# Patient Record
Sex: FEMALE | Race: WHITE
[De-identification: names, ages, dates, MRNs, and addresses within clinical notes are randomized per-mention and may not be internally consistent; named-entity substitution may affect disease eponyms.]

---

## 2017-08-23 ENCOUNTER — HOSPITAL ENCOUNTER (EMERGENCY)
Dept: HOSPITAL 74 - JER | Age: 27
Discharge: HOME | End: 2017-08-23
Payer: COMMERCIAL

## 2017-08-23 VITALS — DIASTOLIC BLOOD PRESSURE: 82 MMHG | HEART RATE: 78 BPM | SYSTOLIC BLOOD PRESSURE: 128 MMHG | TEMPERATURE: 98.8 F

## 2017-08-23 VITALS — BODY MASS INDEX: 22.1 KG/M2

## 2017-08-23 DIAGNOSIS — M94.0: Primary | ICD-10-CM

## 2017-08-23 LAB
ALBUMIN SERPL-MCNC: 4.3 G/DL (ref 3.4–5)
ALP SERPL-CCNC: 64 U/L (ref 45–117)
ALT SERPL-CCNC: 23 U/L (ref 12–78)
ANION GAP SERPL CALC-SCNC: 5 MMOL/L (ref 8–16)
APPEARANCE UR: CLEAR
AST SERPL-CCNC: 18 U/L (ref 15–37)
BASOPHILS # BLD: 0.3 % (ref 0–2)
BILIRUB SERPL-MCNC: 1.2 MG/DL (ref 0.2–1)
BILIRUB UR STRIP.AUTO-MCNC: NEGATIVE MG/DL
CALCIUM SERPL-MCNC: 9.2 MG/DL (ref 8.5–10.1)
CK SERPL-CCNC: 97 IU/L (ref 26–192)
CO2 SERPL-SCNC: 32 MMOL/L (ref 21–32)
COLOR UR: (no result)
CREAT SERPL-MCNC: 0.6 MG/DL (ref 0.55–1.02)
DEPRECATED RDW RBC AUTO: 14.3 % (ref 11.6–15.6)
EOSINOPHIL # BLD: 0.4 % (ref 0–4.5)
GLUCOSE SERPL-MCNC: 87 MG/DL (ref 74–106)
KETONES UR QL STRIP: NEGATIVE
LEUKOCYTE ESTERASE UR QL STRIP.AUTO: NEGATIVE
MAGNESIUM SERPL-MCNC: 2.4 MG/DL (ref 1.8–2.4)
MCH RBC QN AUTO: 29.7 PG (ref 25.7–33.7)
MCHC RBC AUTO-ENTMCNC: 33.4 G/DL (ref 32–36)
MCV RBC: 88.8 FL (ref 80–96)
NEUTROPHILS # BLD: 69.7 % (ref 42.8–82.8)
NITRITE UR QL STRIP: NEGATIVE
PH UR: 7 [PH] (ref 5–8)
PLATELET # BLD AUTO: 307 K/MM3 (ref 134–434)
PMV BLD: 7.8 FL (ref 7.5–11.1)
PROT SERPL-MCNC: 7.7 G/DL (ref 6.4–8.2)
PROT UR QL STRIP: NEGATIVE
PROT UR QL STRIP: NEGATIVE
RBC # UR STRIP: NEGATIVE /UL
SP GR UR: 1.02 (ref 1–1.02)
TROPONIN I SERPL-MCNC: < 0.02 NG/ML (ref 0–0.05)
UROBILINOGEN UR STRIP-MCNC: NEGATIVE MG/DL (ref 0.2–1)
WBC # BLD AUTO: 7.3 K/MM3 (ref 4–10)

## 2017-08-23 NOTE — PDOC
History of Present Illness





- General


Chief Complaint: Chest Pain


Stated Complaint: CHEST PAIN


Time Seen by Provider: 08/23/17 12:51


History Source: Patient


Exam Limitations: No Limitations





- History of Present Illness


Initial Comments: 


08/23/17 14:41


Patient is a 27-year-old female, with a past medical history of coronary AV 

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 

hemodynamically insignificant in 2012, who presents to the emergency department 

today complaining of left-sided chest pain and arm pain. Patient states that 

her pain began 2 days ago and has gotten worse over that time she states that 

the pain is mostly on her left side and radiates down her left arm she states 

that it hurts to lift her left arm and causes her more chest pain. She states 

that her chest pain is reproducible with a big breath or palpation. Patient is 

a physical therapy aid. She is currently at the end of her menstrual cycle. 

Endorses palpitations. Denies fevers, chills, recent illness, shortness of 

breath, recent trauma, OCP use, nausea, vomiting, diarrhea, frequency, urgency 

and dysuria.











Past History





- Travel


Traveled outside of the country in the last 30 days: No


Close contact w/someone who was outside of country & ill: No





- Past Medical History


Allergies/Adverse Reactions: 


 Allergies











Allergy/AdvReac Type Severity Reaction Status Date / Time


 


No Known Allergies Allergy   Verified 08/23/17 12:32











Home Medications: 


Ambulatory Orders





Oxycodone HCl/Acetaminophen [Percocet 5-325 mg Tablet] 1 tab PO Q6H PRN #10 

tablet MDD 4 08/23/17 








Cardiac Disorders: Yes (CHILDHOOD CARDIAC VESSEL ABNORMAL)


Other medical history: [facility] 08/19/2017


Comment:: 





08/23/17 17:42


Hx of coronary AV fistula from the LAD to the main pulmonary artery





- Psycho/Social/Smoking Cessation Hx


Anxiety: No


Suicidal Ideation: No


Smoking History: Never smoked


Hx Alcohol Use: Yes (SOCIAL)


Drug/Substance Use Hx: No


Substance Use Type: None





**Review of Systems





- Review of Systems


Able to Perform ROS?: No


Is the patient limited English proficient: No


Constitutional: No: Chills, Fever, Malaise, Weakness


Respiratory: No: Cough, Shortness of Breath, Wheezing


Cardiac (ROS): Yes: Chest Pain, Palpitations.  No: Lightheadedness, Syncope, 

Chest Tightness


ABD/GI: No: Diarrhea, Nausea, Vomiting


Musculoskeletal: Yes: Muscle Pain (L chest)


All Other Systems: Reviewed and Negative





*Physical Exam





- Vital Signs


 Last Vital Signs











Temp Pulse Resp BP Pulse Ox


 


 98.0 F   91 H  20   132/93   97 


 


 08/23/17 12:29  08/23/17 12:29  08/23/17 12:29  08/23/17 12:29  08/23/17 12:29














- Physical Exam


Comments: 





08/23/17 15:10


GENERAL:


Well developed, well nourished. Awake and alert. No acute distress.


HEENT:


Normocephalic, atraumatic. PERRLA, EOMI. No conjunctival pallor. Sclera are non-

icteric. Moist mucous membranes. Oropharynx is clear.


NECK: 


Supple. Full ROM. No JVD. Carotid pulses 2+ and symmetric, without bruits. No 

thyromegaly. No lymphadenopathy.


CARDIOVASCULAR:


TTP of the L chest wall. Reproducable pain with inspiration. Regular rate and 

rhythm. No murmurs, rubs, or gallops. Distal pulses are 2+ and symmetric. 


PULMONARY: 


No evidence of respiratory distress. Lungs clear to auscultation bilaterally. 

No wheezing, rales or rhonchi.


ABDOMINAL:


Soft. Non-tender. Non-distended. No rebound or guarding. No organomegaly. 

Normoactive bowel sounds. 


MUSCULOSKELETAL 


Normal range of motion at all joints. No bony deformities or tenderness. No CVA 

tenderness.


EXTREMITIES: 


No cyanosis. No clubbing. No edema. No calf tenderness.


SKIN: 


Warm and dry. Normal capillary refill. No rashes. No jaundice. 


NEUROLOGICAL: 


Alert, awake, appropriate. Cranial nerves 2-12 intact. No deficits to light 

touch and temperature in face, upper extremities and lower extremities. No 

motor deficits in the in face, upper extremities and lower extremities. 

Normoreflexic in the upper and lower extremities. Normal speech. Toes are down-

going bilaterally. Gait is normal without ataxia.


PSYCHIATRIC: 


Cooperative. Good eye contact. Appropriate mood and affect.





ED Treatment Course





- LABORATORY


CBC & Chemistry Diagram: 


 08/23/17 13:30





 08/23/17 13:30





Medical Decision Making





- Medical Decision Making


08/23/17 14:47


Pt. is a 26 y/o female with PMH of AV fistula of the LAD and pulmonary artery, 

who presents to the ED c/o left sided chest pain. Will r/o ACS at this time. 

Other possible dx includes pneumonia, costrochondritis Will obtain old medical 

records from her cardiologist in [facility]. Pt has no current cardiologist





1. CBC, CMP, Cardiac Labs, UA, Upregnancy


2. EKG, CXR


3. Torodol


4. Re-evaluate





08/23/17 16:40


Medical records received from [facility]. Spoke with Dr. Tate, cardiology on 

call. States that fistulas are slow changing and probably not the source of her 

pain. As long as trop is negative, pt can go home with outpatient follow up.





EKG: NSR rate 77 normal intervals, axis. No acute ST-T wave changes.





Pt. still c/o pain. Will give morphine at this time.





08/23/17 17:06


Trop is negative. Pt. reporting relief of pain with morphine. Will discharge 

home with percocets and cardiology follow up





*DC/Admit/Observation/Transfer


Diagnosis at time of Disposition: 


 Acute costochondritis





- Discharge Dispostion


Disposition: HOME


Admit: No





- Prescriptions


Prescriptions: 


Oxycodone HCl/Acetaminophen [Percocet 5-325 mg Tablet] 1 tab PO Q6H PRN #10 

tablet MDD 4


 PRN Reason: Pain





- Referrals


Referrals: 


Selma Fields MD [Primary Care Provider] - 


Dwaine Sebastian MD [Staff Physician] - 





- Patient Instructions


Printed Discharge Instructions:  DI for Atypical Chest Pain


Additional Instructions: 


Your testing was normal today. Her EKG was normal. This is most likely a 

strained muscle in her chest wall. Take ibuprofen 600 mg every 8 hours for one 

week. This medication with food. Your also prescribed Percocet. Take this 

medication as needed for breakthrough pain. Do not drive after taking this 

medication as it may make you sleepy. Follow up with the cardiology consult 

provided in your discharge packet within the week. Follow up with her primary 

care doctor within the week.





Return to the emergency department if you have worsening chest pain, shortness 

of breath, palpitations, fevers, chills or any changes in your symptoms.





- Post Discharge Activity


Work/School Note:  Back to Work

## 2017-08-23 NOTE — PDOC
Attending Attestation





- Resident


Resident Name: Gabrielle Aguilar





- ED Attending Attestation


I have performed the following: I have examined & evaluated the patient, The 

case was reviewed & discussed with the resident, I agree w/resident's findings 

& plan, Exceptions are as noted





- HPI


HPI: 


08/23/17 13:11


27y F hx of unclera cardiac history (had fu with peds cardiology when she was 

young) presents with L sided chest pain that is worse when she lifts her L arm, 

the pain also is worse when she takes a deep breath. Pt deenies any sob, 

hemoptysis, leg swelling, calf pain, coughing, RAMIREZ or worsening of CP on 

exertion.


Pt also notes the pain seems worse when she is reclined.





GENERAL: The patient is awake, alert, and fully oriented, Nontoxic - in no 

acute distress.


HEAD: Normocephalic, atraumatic.


EYES: extraocular movements intact, sclera anicteric, conjunctiva clear.


ENT: Normal voice,  Moist mucous membranes.


NECK: Normal range of motion, supple


LUNGS: Breath sounds equal, clear to auscultation bilaterally.  No wheezes, no 

rhonchi, no rales.


HEART: Regular rate and rhythm, normal S1 and S2 without murmur, rub or gallop.


CHesT: Mild tenderness to L chest on palpation at the lateral insertion of 

pectorallis major


ABDOMEN: Soft, nontender, normoactive bowel sounds.  No guarding, no rebound.  

. No CVA tenderness


EXTREMITIES: Normal range of motion, no edema.  No clubbing or cyanosis. No 

cords, erythema, or tenderness.


NEUROLOGICAL: No facial assymetry, Normal speech, 


PSYCH: Normal mood, normal affect.


SKIN: Warm, Dry, normal turgor,








consider MSK 


no signs of pericarditis via EKG


will reach out to cardiology from [facility] for her cardiac history








- Physicial Exam


PE: 





08/25/17 16:06


see above





- Medical Decision Making





08/25/17 16:06


see above





**Heart Score/ECG Review





- ECG Impressions


Comment:: 





08/23/17 15:11


Twelve-lead EKG was performed and reviewed by me. 


There is normal sinus rhythm with a normal rate. 


Rate of 67


The axis is normal. 


The intervals are normal. 


There is normal R wave progression


There are no ST or T wave abnormalities.





Impression: Normal twelve-lead EKG

## 2017-08-27 NOTE — EKG
Test Reason : 

Blood Pressure : ***/*** mmHG

Vent. Rate : 067 BPM     Atrial Rate : 067 BPM

   P-R Int : 118 ms          QRS Dur : 086 ms

    QT Int : 384 ms       P-R-T Axes : 045 060 039 degrees

   QTc Int : 405 ms

 

NORMAL SINUS RHYTHM WITH SHORT [place]

NO PREVIOUS ECGS AVAILABLE

Confirmed by MARCELLUS MARTINEZ MD ([id number]) on 8/27/2017 9:51:52 PM

 

Referred By:             Confirmed By:MARCELLUS MARTINEZ MD

## 2019-03-18 ENCOUNTER — HOSPITAL ENCOUNTER (EMERGENCY)
Dept: HOSPITAL 74 - JERFT | Age: 29
Discharge: HOME | End: 2019-03-18
Payer: SELF-PAY

## 2019-03-18 VITALS — HEART RATE: 118 BPM | SYSTOLIC BLOOD PRESSURE: 134 MMHG | TEMPERATURE: 98.2 F | DIASTOLIC BLOOD PRESSURE: 85 MMHG

## 2019-03-18 VITALS — BODY MASS INDEX: 22.8 KG/M2

## 2019-03-18 DIAGNOSIS — J01.90: Primary | ICD-10-CM

## 2019-03-18 NOTE — PDOC
History of Present Illness





- General


Chief Complaint: Cold Symptoms


Stated Complaint: CHEST PAIN


Time Seen by Provider: 03/18/19 12:32





- History of Present Illness


Initial Comments: 





03/18/19 12:59


29-year-old female with an underlying cardiac abnormality she is unsure of 

presents for evaluation of sinus congestion intermittent headache subjective 

fever and chest pain over the last few days which started after coughing 

episodes.





Past History





- Past Medical History


Allergies/Adverse Reactions: 


 Allergies











Allergy/AdvReac Type Severity Reaction Status Date / Time


 


No Known Allergies Allergy   Verified 03/18/19 12:28











Home Medications: 


Ambulatory Orders





Oxycodone HCl/Acetaminophen [Percocet 5-325 mg Tablet] 1 tab PO Q6H PRN #10 

tablet MDD 4 08/23/17 


Amox-Tr/K Cl [Augmentin - 875Mg Tablet] 1 tab PO BID #20 tablet 03/18/19 


Budesonide [Rhinocort Allergy] 1 spray NS ONCE #1 spray.pump 03/18/19 








Cardiac Disorders: Yes (CHILDHOOD CARDIAC VESSEL ABNORMAL)


COPD: No





- Immunization History


Immunization Up to Date: Yes





- Suicide/Smoking/Psychosocial Hx


Smoking History: Never smoked


Information on smoking cessation initiated: No


Hx Alcohol Use: No


Drug/Substance Use Hx: No


Substance Use Type: None





**Review of Systems





- Review of Systems


Constitutional: Yes: Fever


HEENTM: Yes: Nose Congestion


Respiratory: Yes: Cough


Cardiac (ROS): Yes: See HPI, Chest Pain


Neurological: Yes: Headache





*Physical Exam





- Vital Signs


 Last Vital Signs











Temp Pulse Resp BP Pulse Ox


 


 98.2 F   118 H  16   134/85   99 


 


 03/18/19 12:28  03/18/19 12:28  03/18/19 12:28  03/18/19 12:28  03/18/19 12:28














- Physical Exam


Comments: 





03/18/19 13:01


HEAD: NC/AT tenderness over the frontal sinuses.


EYES: Conjuntiva clear


Ears: Canals and TM's normal


NOSE: Clear discharge injected turbinates.


THROAT: Moist mucous membrances, oral pharanx clear, uvula midline


NECK: Supple without adenopathy


CARDIAC: S1 S2


LUNGS: CTA Full and Equal breath sounds


ABDOMEN: Soft NT ND


MS: Full ROM in all joints without edema 


NEUROLOGIC: No gross sensory or motor deficits, NVID


SKIN: Normal color and temperature no lesions or rashes





Moderate Sedation





- Procedure Monitoring


Vital Signs: 


Procedure Monitoring Vital Signs











Temperature  98.2 F   03/18/19 12:28


 


Pulse Rate  118 H  03/18/19 12:28


 


Respiratory Rate  16   03/18/19 12:28


 


Blood Pressure  134/85   03/18/19 12:28


 


O2 Sat by Pulse Oximetry (%)  99   03/18/19 12:28











Medical Decision Making





- Medical Decision Making





03/18/19 13:02


EKG was reviewed with emergency room attending. We'll treat for bacterial 

sinusitis have patient follow-up with ENT.





*DC/Admit/Observation/Transfer


Diagnosis at time of Disposition: 


 Sinusitis








- Discharge Dispostion


Disposition: HOME


Condition at time of disposition: Stable


Decision to Admit order: No





- Prescriptions


Prescriptions: 


Amox-Tr/K Cl [Augmentin - 875Mg Tablet] 1 tab PO BID #20 tablet


Budesonide [Rhinocort Allergy] 1 spray NS ONCE #1 spray.pump





- Referrals


Referrals: 


Fredi Yarbrough MD [Staff Physician] - 





- Patient Instructions


Printed Discharge Instructions:  DI for Sinusitis, Sinusitis


Additional Instructions: 


Please use the nasal spray and antibiotics as directed. Please finish the 

entire course of the antibiotics. Return to the emergency room should symptoms 

worsen follow-up with ear nose and throat doctor in 1-2 days for further 

evaluation and treatment options.





- Post Discharge Activity

## 2019-03-20 NOTE — EKG
Test Reason : 

Blood Pressure : ***/*** mmHG

Vent. Rate : 110 BPM     Atrial Rate : 110 BPM

   P-R Int : 130 ms          QRS Dur : 082 ms

    QT Int : 316 ms       P-R-T Axes : 055 047 025 degrees

   QTc Int : 427 ms

 

SINUS TACHYCARDIA

POSSIBLE LEFT ATRIAL ENLARGEMENT

BORDERLINE ECG

WHEN COMPARED WITH ECG OF 23-AUG-2017 12:39,

VENT. RATE HAS INCREASED BY  43 BPM

Confirmed by JORGE ALBERTO KELLY MD ([id number]) on 3/20/2019 12:07:15 PM

 

Referred By:             Confirmed By:JORGE ALBERTO KELLY MD

## 2020-01-05 ENCOUNTER — HOSPITAL ENCOUNTER (EMERGENCY)
Dept: HOSPITAL 74 - JERFT | Age: 30
Discharge: HOME | End: 2020-01-05
Payer: SELF-PAY

## 2020-01-05 VITALS — HEART RATE: 96 BPM | TEMPERATURE: 98 F | DIASTOLIC BLOOD PRESSURE: 78 MMHG | SYSTOLIC BLOOD PRESSURE: 121 MMHG

## 2020-01-05 VITALS — BODY MASS INDEX: 22.1 KG/M2

## 2020-01-05 DIAGNOSIS — J10.1: Primary | ICD-10-CM

## 2020-01-05 NOTE — PDOC
History of Present Illness





- General


Chief Complaint: Cold Symptoms


Stated Complaint: COUGHING/FEVER/HEADACHE


Time Seen by Provider: 01/05/20 20:10


History Source: Patient


Exam Limitations: No Limitations





- History of Present Illness


Initial Comments: 





01/05/20 20:41


Patient is a 29-year-old female who presents to the ED with complaint of body 

aches, subjective fever, headache and URI symptoms for the last 6 days.  She 

has been taking TheraFlu which has been helping with her fever.  She took 

TheraFlu about 2 hours ago which has helped.  She denies any neck pain.  She 

denies any known sick contacts.  She did not get a flu shot this year.  The 

patient states that she has a history of heart problems but is unsure of what 

kind.








Past History





- Past Medical History


Allergies/Adverse Reactions: 


 Allergies











Allergy/AdvReac Type Severity Reaction Status Date / Time


 


No Known Allergies Allergy   Verified 01/05/20 20:32











Home Medications: 


Ambulatory Orders





NK [No Known Home Medication]  01/05/20 








Cardiac Disorders: Yes (CHILDHOOD CARDIAC VESSEL ABNORMAL)


COPD: No





- Immunization History


Immunization Up to Date: Yes





- Psycho Social/Smoking Cessation Hx


Smoking History: Never smoked


Hx Alcohol Use: No


Drug/Substance Use Hx: No


Substance Use Type: None





**Review of Systems





- Review of Systems


Comments:: 





01/05/20 20:41


- Review of Systems


Able to Perform ROS?: Yes


Constitutional: No: Night Sweats, Weakness; Positive chills, positive 

subjective fever, positive loss of appetite


HEENTM: No: Eye Pain, Vision changes, Ear Pain, Throat Pain, Throat Swelling, 

Mouth Pain, Difficulty Swallowing, positive nasal congestion


Respiratory: No: Cough, Shortness of Breath, Wheezing, Sputum Production


Cardiac (ROS): No: Chest Pain, Chest Tightness, Palpitations, Irregular Heart 

Beat, Edema


ABD/GI: No: Nausea, Vomiting, Abdominal Pain, Diarrhea


: No Dysuria, No Hematuria, No Frequency, No Urgency, No Vaginal Discharge/

Pain, No Penile Discharge/Pain


Musculoskeletal: No: Back Pain, Joint Pain, Muscle Weakness, Neck Pain; 

positive muscle aches


Integumentary: No: Lesions, Rash


Neurological: No: Headache, Numbness, Tingling, Weakness, Speech Difficulties





*Physical Exam





- Vital Signs


 Last Vital Signs











Temp Pulse Resp BP Pulse Ox


 


 98 F   96 H  20   121/78   98 


 


 01/05/20 20:03  01/05/20 20:03  01/05/20 20:03  01/05/20 20:03  01/05/20 20:03














- Physical Exam





01/05/20 20:43


- Physical Exam


General Appearance:  Nourished, Appropriately Dressed; mild distress secondary 

to general unwell feeling


HEENT: EOMI, Normal Voice, TMs Normal, No Pharyngeal Erythema, No Nasal 

Congestion, Hearing Grossly Normal, No TM Bulging.  No Muffled/Hoarse voice, No 

Tonsillar Exudate, No Tonsillar Erythema, No TM Dullness, No TM Erythema; 

positive rhinorrhea


Neck: Supple, No Lymphadenopathy (R), No Lymphadenopathy (L), No Rigidity, No 

Decreased range of motion


Respiratory/Chest: Lungs Clear, Normal Breath Sounds.  No Respiratory Distress, 

No Accessory Muscle Use


Cardiovascular: Regular Rhythm, Regular Rate, S1, S2


Gastrointestinal/Abdominal: Normal Bowel Sounds, Soft.  Non-tender


Musculoskeletal: Normal Inspection.  No Decreased Range of Motion


Extremity: Normal Capillary Refill, Normal Inspection


Integumentary:  Normal Color, Dry.  No Rash


Neurologic:  Fully Oriented, Alert, Normal Mood/Affect, Normal Response


01/05/20 20:44








ED Treatment Course





- ADDITIONAL ORDERS


Additional order review: 





01/05/20 21:22


 Laboratory Tests











  01/05/20





  20:40


 


Influenza A (Rapid)  Negative


 


Influenza B (Rapid)  Positive A














Medical Decision Making





- Medical Decision Making





01/05/20 21:23


The patient is influenza B positive but since she is 5 days since her symptoms 

started she is not indicated for Tamiflu.  She has been encouraged to increase 

her fluids and get plenty of rest.  She should also take Tylenol or ibuprofen 

for fevers or body aches.  She should follow-up with her primary doctor within 

1 to 2 days for repeat evaluation.  She has been made aware that the symptoms 

can last up to 2 weeks.  She should return to the ED for any worsening symptoms 

such as high fevers, shaking chills, profuse vomiting or any other worsening 

symptoms.





Discharge





- Discharge Information


Problems reviewed: Yes


Clinical Impression/Diagnosis: 


 Influenza B





Condition: Stable


Disposition: HOME





- Follow up/Referral





- Patient Discharge Instructions


Patient Printed Discharge Instructions:  DI for Influenza -- Adult


Additional Instructions: 


You should get plenty of rest and drink plenty of fluids.  Take Tylenol or 

ibuprofen for fevers or body aches.  You should follow-up with your primary 

doctor within 1 to 2 days for repeat evaluation.  Return to the emergency 

department for worsening high fevers, shaking chills, profuse vomiting or any 

other worsening symptoms.





- Post Discharge Activity


Work/Back to School Note:  Back to Work